# Patient Record
Sex: MALE | Race: WHITE
[De-identification: names, ages, dates, MRNs, and addresses within clinical notes are randomized per-mention and may not be internally consistent; named-entity substitution may affect disease eponyms.]

---

## 2017-03-18 ENCOUNTER — HOSPITAL (OUTPATIENT)
Dept: OTHER | Age: 22
End: 2017-03-18
Attending: PEDIATRICS

## 2017-03-18 LAB
IGA SERPL-MCNC: 213 MG/DL (ref 82–453)
TTG IGA SER IA-ACNC: 5 UNIT

## 2020-07-07 ENCOUNTER — HOSPITAL ENCOUNTER (OUTPATIENT)
Age: 25
Discharge: HOME | End: 2020-07-07
Payer: COMMERCIAL

## 2020-07-07 VITALS
RESPIRATION RATE: 16 BRPM | OXYGEN SATURATION: 100 % | SYSTOLIC BLOOD PRESSURE: 154 MMHG | DIASTOLIC BLOOD PRESSURE: 82 MMHG | BODY MASS INDEX: 24.25 KG/M2 | HEIGHT: 68 IN | HEART RATE: 88 BPM | TEMPERATURE: 99 F | WEIGHT: 160 LBS

## 2020-07-07 DIAGNOSIS — S61.211A LACERATION OF LEFT INDEX FINGER WITHOUT FOREIGN BODY WITHOUT DAMAGE TO NAIL, INITIAL ENCOUNTER: Primary | ICD-10-CM

## 2020-07-07 PROCEDURE — 90715 TDAP VACCINE 7 YRS/> IM: CPT | Performed by: NURSE PRACTITIONER

## 2020-07-07 PROCEDURE — 12001 RPR S/N/AX/GEN/TRNK 2.5CM/<: CPT | Performed by: NURSE PRACTITIONER

## 2020-07-07 PROCEDURE — 90471 IMMUNIZATION ADMIN: CPT | Performed by: NURSE PRACTITIONER

## 2020-07-07 RX ORDER — OMEPRAZOLE 40 MG/1
CAPSULE, DELAYED RELEASE ORAL
COMMUNITY
Start: 2020-05-20

## 2020-07-08 NOTE — ED PROVIDER NOTES
Patient Seen in: [address]      History   Patient presents with:  Laceration    Stated Complaint: LACERATION    HPI    Laceration to the left index finger medially at the PIP joint.   Patient states that he was working w Conjunctiva/sclera: Conjunctivae normal.      Pupils: Pupils are equal, round, and reactive to light. Neck:      Musculoskeletal: Normal range of motion and neck supple. Cardiovascular:      Rate and Rhythm: Normal rate and regular rhythm.       Hear Multiple medical diagnoses considered. Wound was irrigated by RN. Tetanus vaccine was updated. Laceration repair performed. Patient was anesthetized with 4 mL's of 1% Lidoderm without epi. Laceration repair was performed.   3 sutures placed using 4-0 n

## 2020-07-08 NOTE — ED NOTES
Patient has three sutures on his left index finger laceration. Wound cleansed and bacitracin ointment applied to suture line. Protective dressing applied. Wound care gone over with patient. Discharged instructions gone over with patient.